# Patient Record
Sex: MALE | Race: BLACK OR AFRICAN AMERICAN | NOT HISPANIC OR LATINO | URBAN - METROPOLITAN AREA
[De-identification: names, ages, dates, MRNs, and addresses within clinical notes are randomized per-mention and may not be internally consistent; named-entity substitution may affect disease eponyms.]

---

## 2018-07-20 ENCOUNTER — EMERGENCY (EMERGENCY)
Facility: HOSPITAL | Age: 28
LOS: 1 days | Discharge: HOME | End: 2018-07-20
Admitting: EMERGENCY MEDICINE
Payer: MEDICAID

## 2018-07-20 VITALS
SYSTOLIC BLOOD PRESSURE: 114 MMHG | RESPIRATION RATE: 16 BRPM | TEMPERATURE: 97 F | WEIGHT: 164.91 LBS | HEART RATE: 90 BPM | HEIGHT: 75 IN | OXYGEN SATURATION: 100 % | DIASTOLIC BLOOD PRESSURE: 75 MMHG

## 2018-07-20 DIAGNOSIS — M26.621 ARTHRALGIA OF RIGHT TEMPOROMANDIBULAR JOINT: ICD-10-CM

## 2018-07-20 DIAGNOSIS — R68.84 JAW PAIN: ICD-10-CM

## 2018-07-20 PROCEDURE — 99283 EMERGENCY DEPT VISIT LOW MDM: CPT

## 2018-07-20 RX ORDER — IBUPROFEN 200 MG
600 TABLET ORAL ONCE
Qty: 0 | Refills: 0 | Status: COMPLETED | OUTPATIENT
Start: 2018-07-20 | End: 2018-07-20

## 2018-07-20 RX ADMIN — Medication 600 MILLIGRAM(S): at 20:02

## 2018-07-20 NOTE — ED PROVIDER NOTE - PHYSICAL EXAMINATION
R TMJ: no clicking or popping. No edema, erythema, warmth, tenderness. Dentition intact- no swelling of mucosa or gums.     VITAL SIGNS: I have reviewed nursing notes and confirm.  CONSTITUTIONAL: Well-developed; well-nourished; in no acute distress.  SKIN: Skin is warm and dry, no acute rash.  HEAD: Normocephalic; atraumatic.  EYES: PERRL, EOM intact; conjunctiva and sclera clear.  ENT: No nasal discharge; airway clear.  NECK: Supple; non tender.  CARD: S1, S2 normal; no murmurs, gallops, or rubs. Regular rate and rhythm.  RESP: No wheezes, rales or rhonchi.  ABD: Normal bowel sounds; soft; non-distended; non-tender; no hepatosplenomegaly.  EXT: Normal ROM. No clubbing, cyanosis or edema.  NEURO: Alert, oriented. Grossly unremarkable.  PSYCH: Cooperative, appropriate.

## 2018-07-20 NOTE — ED ADULT TRIAGE NOTE - CHIEF COMPLAINT QUOTE
c/o right sided jaw pain, x1year " sometimes its my tooth, sometimes its that jaw, I feel like I have to crack it, like a knuckle". Pt eating and drinking during triage without difficulty

## 2018-07-20 NOTE — ED ADULT NURSE NOTE - CHPI ED SYMPTOMS NEG
no chills/no numbness/no fever/no tingling/no vomiting/no dizziness/no decreased eating/drinking/no nausea/no weakness

## 2018-07-20 NOTE — ED PROVIDER NOTE - OBJECTIVE STATEMENT
29 y/o M   no pmhx    Pt presents to ER with c/o 1 year of R sided jaw pain at TMJ. Denies inciting event or injury. Denies dental pain. Admits he has never seen a doctor for this complaint. Admits he has never taken any medication for sxs. Pt is chewing food and watching TV when I arrive bedside